# Patient Record
Sex: FEMALE | URBAN - METROPOLITAN AREA
[De-identification: names, ages, dates, MRNs, and addresses within clinical notes are randomized per-mention and may not be internally consistent; named-entity substitution may affect disease eponyms.]

---

## 2018-04-24 ENCOUNTER — NEW PATIENT (OUTPATIENT)
Dept: URBAN - METROPOLITAN AREA CLINIC 10 | Facility: CLINIC | Age: 78
End: 2018-04-24
Payer: MEDICARE

## 2018-04-24 DIAGNOSIS — H40.013 OPEN ANGLE WITH BORDERLINE FINDINGS, LOW RISK, BILATERAL: ICD-10-CM

## 2018-04-24 DIAGNOSIS — D35.2 BENIGN NEOPLASM OF PITUITARY GLAND: ICD-10-CM

## 2018-04-24 DIAGNOSIS — H25.13 AGE-RELATED NUCLEAR CATARACT, BILATERAL: Primary | ICD-10-CM

## 2018-04-24 PROCEDURE — 92004 COMPRE OPH EXAM NEW PT 1/>: CPT | Performed by: OPTOMETRIST

## 2018-04-24 ASSESSMENT — VISUAL ACUITY
OS: 20/30
OD: 20/40

## 2018-04-24 ASSESSMENT — KERATOMETRY
OS: 44.88
OD: 44.75

## 2018-04-24 ASSESSMENT — INTRAOCULAR PRESSURE
OD: 19
OS: 19

## 2023-06-19 ENCOUNTER — OFFICE VISIT (OUTPATIENT)
Dept: URBAN - METROPOLITAN AREA CLINIC 24 | Facility: CLINIC | Age: 83
End: 2023-06-19
Payer: MEDICARE

## 2023-06-19 DIAGNOSIS — H52.4 PRESBYOPIA: ICD-10-CM

## 2023-06-19 DIAGNOSIS — D35.2 BENIGN NEOPLASM OF PITUITARY GLAND: Primary | ICD-10-CM

## 2023-06-19 PROCEDURE — 99204 OFFICE O/P NEW MOD 45 MIN: CPT | Performed by: OPTOMETRIST

## 2023-06-19 ASSESSMENT — INTRAOCULAR PRESSURE
OD: 11
OS: 12

## 2023-06-19 ASSESSMENT — VISUAL ACUITY
OS: 20/20
OD: 20/20

## 2023-06-19 ASSESSMENT — KERATOMETRY
OD: 44.39
OS: 44.47

## 2023-06-19 NOTE — IMPRESSION/PLAN
Impression: Presbyopia: H52.4. Plan: New srx released per patient request. Understands BCVA limited. **Intermittent shadowing at distance. Pt declines diplopia eval. Pt to f/u with neurology. May schedule consultation with [facility] if shadowing worsens or becomes bothersome.

## 2023-06-19 NOTE — IMPRESSION/PLAN
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 Per patient: no change, under the care of neurology.